# Patient Record
Sex: FEMALE | URBAN - METROPOLITAN AREA
[De-identification: names, ages, dates, MRNs, and addresses within clinical notes are randomized per-mention and may not be internally consistent; named-entity substitution may affect disease eponyms.]

---

## 2017-06-13 ENCOUNTER — EMERGENCY (EMERGENCY)
Facility: HOSPITAL | Age: 52
LOS: 1 days | Discharge: HOME | End: 2017-06-13
Attending: EMERGENCY MEDICINE | Admitting: EMERGENCY MEDICINE
Payer: MEDICAID

## 2017-06-13 VITALS
RESPIRATION RATE: 16 BRPM | DIASTOLIC BLOOD PRESSURE: 114 MMHG | TEMPERATURE: 98 F | SYSTOLIC BLOOD PRESSURE: 158 MMHG | OXYGEN SATURATION: 98 % | HEART RATE: 76 BPM

## 2017-06-13 PROCEDURE — 99283 EMERGENCY DEPT VISIT LOW MDM: CPT | Mod: 25

## 2017-06-13 PROCEDURE — 90471 IMMUNIZATION ADMIN: CPT

## 2017-06-13 PROCEDURE — 90715 TDAP VACCINE 7 YRS/> IM: CPT

## 2017-06-13 PROCEDURE — 99283 EMERGENCY DEPT VISIT LOW MDM: CPT

## 2017-06-13 RX ORDER — TETANUS TOXOID, REDUCED DIPHTHERIA TOXOID AND ACELLULAR PERTUSSIS VACCINE, ADSORBED 5; 2.5; 8; 8; 2.5 [IU]/.5ML; [IU]/.5ML; UG/.5ML; UG/.5ML; UG/.5ML
0.5 SUSPENSION INTRAMUSCULAR ONCE
Qty: 0 | Refills: 0 | Status: COMPLETED | OUTPATIENT
Start: 2017-06-13 | End: 2017-06-13

## 2017-06-13 RX ADMIN — TETANUS TOXOID, REDUCED DIPHTHERIA TOXOID AND ACELLULAR PERTUSSIS VACCINE, ADSORBED 0.5 MILLILITER(S): 5; 2.5; 8; 8; 2.5 SUSPENSION INTRAMUSCULAR at 19:05

## 2017-06-13 RX ADMIN — Medication 1 TABLET(S): at 19:07

## 2017-06-13 NOTE — ED PROVIDER NOTE - MEDICAL DECISION MAKING DETAILS
Attending MD Hopson: 51F with no reported PMH presents to the ED with L thigh bite; Attempt to contact dog owner at [phone], TDaP, Augmentin

## 2017-06-13 NOTE — ED PROVIDER NOTE - PROGRESS NOTE DETAILS
Attending MD Hopson: Spoke with contact at [phone] who reports the he was walking dog "Mehrdad" which is his girlfriend's dog.  Reports UTD on all vaccines.  Reports that he was cleaning up after the dog when it happened and suspects that the dog might have been startled.  Gave me the mother's contact information, Melissa Prado, [phone].  Ms. Prado contacted and verifies that dog is UTD on all vaccines.  Information provided to patient.  Will defer rabies given that information.

## 2017-06-13 NOTE — ED ADULT NURSE NOTE - OBJECTIVE STATEMENT
51 year old female A&OX3 presents with a dog bite to the left upper leg. Patient states she was walking by when a dog bit her in the left leg unprovoked. Patient states the dog belongs to a neighbor and neighbor states the dog has been vaccinated. Patient has a small punctured wound to the left anterior thigh. Bleeding has been controlled with direct pressure.

## 2017-06-13 NOTE — ED PROVIDER NOTE - PLAN OF CARE
1. You may take Motrin 600mg every 6 hours as needed for pain. Take the prescribed antibiotic as directed for the full course, Augmentin 875/125mg tablet twice a day for 10 days.   2. Follow up with your Primary Care Physician as soon as possible for further evaluation.   3. Return to the Emergency Department for any concerning symptoms.

## 2017-06-13 NOTE — ED PROVIDER NOTE - OBJECTIVE STATEMENT
51 y.o. female no significant PMHx p/w dog bite to the left thigh. She is Cymro speaking only, translation assistance provided by her daughter with her permission. She states she was walking on the sidewalk earlier today and a man was walking his dog on a leash beside her when it lunged and bit her on the anterior left thigh. She reports a small central area of ecchymosis. Denies fevers, chills. She is unsure of the dogs vaccination status.

## 2017-06-13 NOTE — ED PROVIDER NOTE - CARE PLAN
Principal Discharge DX:	Dog bite of left thigh, initial encounter Principal Discharge DX:	Dog bite of left thigh, initial encounter  Instructions for follow-up, activity and diet:	1. You may take Motrin 600mg every 6 hours as needed for pain. Take the prescribed antibiotic as directed for the full course, Augmentin 875/125mg tablet twice a day for 10 days.   2. Follow up with your Primary Care Physician as soon as possible for further evaluation.   3. Return to the Emergency Department for any concerning symptoms.

## 2017-06-13 NOTE — ED PROVIDER NOTE - ATTENDING CONTRIBUTION TO CARE
Attending MD Hopson: I personally have seen and examined this patient.  Resident note reviewed and agree on plan of care and except where noted.  See below for details.     **DECLINED TELEPHONE , PREFERS TO USE DAUGHTER, NEGRO, WHO IS BEDSIDE**    51F with no reported PMH presents to the ED with L anterior thigh dog bite.  Reports that she was walking on the street when she was bitten by a medium size white curly haired dog (indicates about 2-3 feet off the floor).  Reports that the dog was being walked on a leash ([phone]) at time of incident 8am.  Reports that wound bled at time of incident but has since stopped.  Reports washed area with soap and water.  Reports came in because was concerned because surrounding area was red immediately after incident.  On exam, FROM at LLE, small 1cm puncture wound to L anterior mid thigh with <1cm surrounding ecchymosis but no erythema, no active bleeding, no purulence, no tenderness to palpation; Plan:  Augmentin here and then Rx, TDaP, contact dog owner for vaccination status
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
## 2017-06-16 DIAGNOSIS — Z23 ENCOUNTER FOR IMMUNIZATION: ICD-10-CM

## 2017-06-16 DIAGNOSIS — S71.102A UNSPECIFIED OPEN WOUND, LEFT THIGH, INITIAL ENCOUNTER: ICD-10-CM
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
## 2017-12-05 ENCOUNTER — EMERGENCY (EMERGENCY)
Facility: HOSPITAL | Age: 52
LOS: 1 days | Discharge: HOME | End: 2017-12-05
Attending: EMERGENCY MEDICINE | Admitting: EMERGENCY MEDICINE
Payer: MEDICAID

## 2017-12-05 VITALS
TEMPERATURE: 100 F | SYSTOLIC BLOOD PRESSURE: 132 MMHG | OXYGEN SATURATION: 97 % | DIASTOLIC BLOOD PRESSURE: 93 MMHG | HEART RATE: 90 BPM | RESPIRATION RATE: 18 BRPM

## 2017-12-05 PROCEDURE — 71020: CPT | Mod: 26

## 2017-12-05 PROCEDURE — 99284 EMERGENCY DEPT VISIT MOD MDM: CPT | Mod: 25

## 2017-12-05 RX ORDER — AMLODIPINE BESYLATE 2.5 MG/1
0 TABLET ORAL
Qty: 0 | Refills: 0 | COMMUNITY

## 2017-12-05 RX ORDER — HYDROCHLOROTHIAZIDE 25 MG
0 TABLET ORAL
Qty: 0 | Refills: 0 | COMMUNITY

## 2017-12-05 NOTE — ED PROVIDER NOTE - MEDICAL DECISION MAKING DETAILS
Jerardo PAYAN: 53 y/o female with hx of HTN and MVP and Smoker here with 2 weeks of SOB, and productive cough. Patient is a Montenegrin speaker and reports she was taking a Russian medication with some improvement but symptoms worsened for the past two days. Endorses fever at home. Denies chills, N/V/D, sore throat, abd pain, urinary symptoms, back pain, travel sick contacts. Exam shows a female in NAD with mild wheezing in RML and LLL and clear skin and soft abd. Consider CAP vs Bronchitis vs Emphysema vs COPD exacerbation vs Viral URI. Plan CXR and reassess.

## 2017-12-05 NOTE — ED PROVIDER NOTE - PROGRESS NOTE DETAILS
- MD Mumtaz,PhD - Resident: Patient found to have flattened diaphragm on CXR. given 2 duonebs with subjective improvement of cough, lungs continue to have crackles at bases, will d/c home on z-pac.

## 2017-12-05 NOTE — ED PROVIDER NOTE - PHYSICAL EXAMINATION
Gen: NAD, AOx3, non-toxic //            Head: NCAT //            HEENT: EOMI, oral mucosa moist, normal conjunctiva //            Lung: crackles at bilateral bases,  speaking in full sentences. //            CV: RRR, no murmurs, rubs or gallops //            Abd: soft, NTND, no guarding, no CVA tenderness //            MSK: no visible deformities //            Neuro: No focal sensory or motor deficits //            Skin: Warm, well perfused, no rash //            Psych: normal affect. ~Danyel Pandya M.D., Ph.D. -Resident

## 2017-12-05 NOTE — ED PROVIDER NOTE - NS ED ROS FT
GENERAL: No fever or chills, //             EYES: no change in vision, //             HEENT: no trouble swallowing or speaking, //             CARDIAC: no chest pain, //                     GI: no abdominal pain, no nausea or no vomiting, no diarrhea or constipation, //             : No changes in urination,  //            SKIN: no rashes,  //            NEURO: no headache,  //             MSK: No joint pain otherwise as HPI or negative. ~Danyel Pandya M.D., Ph.D. -Resident

## 2017-12-05 NOTE — ED ADULT NURSE NOTE - OBJECTIVE STATEMENT
52y female with hx of htn and valve prolapse presents to the ER c/o cough x 2 weeks. pt is alert and oriented x 4 and speaking coherently. Pt states she took a Russian anti viral medication and felt better for a brief period of time but now presents to the ER c/o fevers and cough. pt c/o cp while coughing, generalized across the chest. denies sob, n/v/d. pt in NAD, respirations equal and regular. daughter at the bedside translating for pt- denies translation services. md butt completed. will reassess.

## 2017-12-05 NOTE — ED PROVIDER NOTE - ATTENDING CONTRIBUTION TO CARE
Attending MD Kurtz:  I personally have seen and examined this patient.  Resident note reviewed and agree on plan of care and except where noted.  See MDM for details.

## 2017-12-05 NOTE — ED PROVIDER NOTE - PLAN OF CARE
You were seen in the ER for acute bronchitis. You must follow up with your primary physician in 24 to 48 hours. Return to the ER for any new or worsening signs/symptoms.   1) Use the breathing treatments as needed for difficulty breathing  2) Take the antibiotics as prescribed.

## 2017-12-05 NOTE — ED PROVIDER NOTE - OBJECTIVE STATEMENT
Jerardo PAYAN: 53 y/o female with hx of HTN and MVP and Smoker here with 2 weeks of SOB, and productive cough. Patient is a Cymraes speaker and reports she was taking a Russian medication with some improvement but symptoms worsened for the past two days. Endorses fever at home. Denies chills, N/V/D, sore throat, abd pain, urinary symptoms, back pain, travel sick contacts. Exam shows a female in NAD with mild wheezing in RML and LLL and clear skin and soft abd. Consider CAP vs Bronchitis vs Emphysema vs COPD exacerbation vs Viral URI. Plan CXR and reassess.

## 2017-12-05 NOTE — ED PROVIDER NOTE - CARE PLAN
Principal Discharge DX:	Acute bronchitis, unspecified organism  Instructions for follow-up, activity and diet:	You were seen in the ER for acute bronchitis. You must follow up with your primary physician in 24 to 48 hours. Return to the ER for any new or worsening signs/symptoms.   1) Use the breathing treatments as needed for difficulty breathing  2) Take the antibiotics as prescribed.

## 2017-12-06 VITALS
RESPIRATION RATE: 18 BRPM | DIASTOLIC BLOOD PRESSURE: 90 MMHG | HEART RATE: 80 BPM | SYSTOLIC BLOOD PRESSURE: 136 MMHG | TEMPERATURE: 100 F | OXYGEN SATURATION: 99 %

## 2017-12-06 PROCEDURE — 71046 X-RAY EXAM CHEST 2 VIEWS: CPT

## 2017-12-06 PROCEDURE — 99283 EMERGENCY DEPT VISIT LOW MDM: CPT | Mod: 25

## 2017-12-06 PROCEDURE — 94640 AIRWAY INHALATION TREATMENT: CPT

## 2017-12-06 RX ORDER — IPRATROPIUM/ALBUTEROL SULFATE 18-103MCG
3 AEROSOL WITH ADAPTER (GRAM) INHALATION ONCE
Qty: 0 | Refills: 0 | Status: COMPLETED | OUTPATIENT
Start: 2017-12-06 | End: 2017-12-06

## 2017-12-06 RX ORDER — AZITHROMYCIN 500 MG/1
1 TABLET, FILM COATED ORAL
Qty: 5 | Refills: 0 | OUTPATIENT
Start: 2017-12-06 | End: 2017-12-11

## 2017-12-06 RX ORDER — IPRATROPIUM/ALBUTEROL SULFATE 18-103MCG
1 AEROSOL WITH ADAPTER (GRAM) INHALATION
Qty: 1 | Refills: 0 | OUTPATIENT
Start: 2017-12-06 | End: 2017-12-13

## 2017-12-06 RX ADMIN — Medication 3 MILLILITER(S): at 00:28

## 2018-08-03 ENCOUNTER — TRANSCRIPTION ENCOUNTER (OUTPATIENT)
Age: 53
End: 2018-08-03

## 2019-03-04 NOTE — ED PROVIDER NOTE - EYES, MLM
Dx with breast cancer  Has appt today with surgeon  Please call tomorrow to check in on patient 
Clear bilaterally, pupils equal, round and reactive to light.

## 2020-03-14 ENCOUNTER — TRANSCRIPTION ENCOUNTER (OUTPATIENT)
Age: 55
End: 2020-03-14

## 2021-05-13 ENCOUNTER — TRANSCRIPTION ENCOUNTER (OUTPATIENT)
Age: 56
End: 2021-05-13

## 2021-10-18 ENCOUNTER — TRANSCRIPTION ENCOUNTER (OUTPATIENT)
Age: 56
End: 2021-10-18

## 2022-06-03 ENCOUNTER — NON-APPOINTMENT (OUTPATIENT)
Age: 57
End: 2022-06-03

## 2023-04-26 PROBLEM — R00.1 BRADYCARDIA, UNSPECIFIED: Chronic | Status: ACTIVE | Noted: 2017-06-13

## 2023-04-26 PROBLEM — I10 ESSENTIAL (PRIMARY) HYPERTENSION: Chronic | Status: ACTIVE | Noted: 2017-12-05

## 2023-04-26 PROBLEM — I34.1 NONRHEUMATIC MITRAL (VALVE) PROLAPSE: Chronic | Status: ACTIVE | Noted: 2017-12-05

## 2023-06-27 ENCOUNTER — APPOINTMENT (OUTPATIENT)
Dept: RADIOLOGY | Facility: HOSPITAL | Age: 58
End: 2023-06-27

## 2023-06-27 ENCOUNTER — APPOINTMENT (OUTPATIENT)
Dept: MAMMOGRAPHY | Facility: HOSPITAL | Age: 58
End: 2023-06-27

## 2023-06-27 ENCOUNTER — OUTPATIENT (OUTPATIENT)
Dept: OUTPATIENT SERVICES | Facility: HOSPITAL | Age: 58
LOS: 1 days | End: 2023-06-27
Payer: MEDICAID

## 2023-06-27 DIAGNOSIS — Z12.31 ENCOUNTER FOR SCREENING MAMMOGRAM FOR MALIGNANT NEOPLASM OF BREAST: ICD-10-CM

## 2023-06-27 DIAGNOSIS — Z13.820 ENCOUNTER FOR SCREENING FOR OSTEOPOROSIS: ICD-10-CM

## 2023-06-27 PROBLEM — Z00.00 ENCOUNTER FOR PREVENTIVE HEALTH EXAMINATION: Status: ACTIVE | Noted: 2023-06-27

## 2023-06-27 PROCEDURE — 77080 DXA BONE DENSITY AXIAL: CPT | Mod: 26

## 2023-06-27 PROCEDURE — 77063 BREAST TOMOSYNTHESIS BI: CPT

## 2023-06-27 PROCEDURE — 77080 DXA BONE DENSITY AXIAL: CPT

## 2023-06-27 PROCEDURE — 77067 SCR MAMMO BI INCL CAD: CPT | Mod: 26

## 2023-06-27 PROCEDURE — 77063 BREAST TOMOSYNTHESIS BI: CPT | Mod: 26

## 2023-06-27 PROCEDURE — 77067 SCR MAMMO BI INCL CAD: CPT

## 2023-07-12 ENCOUNTER — APPOINTMENT (OUTPATIENT)
Dept: ULTRASOUND IMAGING | Facility: HOSPITAL | Age: 58
End: 2023-07-12

## 2023-10-29 ENCOUNTER — NON-APPOINTMENT (OUTPATIENT)
Age: 58
End: 2023-10-29

## 2023-11-02 ENCOUNTER — NON-APPOINTMENT (OUTPATIENT)
Age: 58
End: 2023-11-02

## 2024-11-13 ENCOUNTER — APPOINTMENT (OUTPATIENT)
Dept: MAMMOGRAPHY | Facility: HOSPITAL | Age: 59
End: 2024-11-13
Payer: COMMERCIAL

## 2024-11-13 ENCOUNTER — OUTPATIENT (OUTPATIENT)
Dept: OUTPATIENT SERVICES | Facility: HOSPITAL | Age: 59
LOS: 1 days | End: 2024-11-13
Payer: COMMERCIAL

## 2024-11-13 DIAGNOSIS — Z00.8 ENCOUNTER FOR OTHER GENERAL EXAMINATION: ICD-10-CM

## 2024-11-13 PROCEDURE — 77063 BREAST TOMOSYNTHESIS BI: CPT | Mod: 26

## 2024-11-13 PROCEDURE — 77067 SCR MAMMO BI INCL CAD: CPT

## 2024-11-13 PROCEDURE — 77067 SCR MAMMO BI INCL CAD: CPT | Mod: 26

## 2024-11-13 PROCEDURE — 77063 BREAST TOMOSYNTHESIS BI: CPT

## 2025-05-22 ENCOUNTER — APPOINTMENT (OUTPATIENT)
Dept: RADIOLOGY | Facility: HOSPITAL | Age: 60
End: 2025-05-22
Payer: COMMERCIAL

## 2025-05-22 ENCOUNTER — OUTPATIENT (OUTPATIENT)
Dept: OUTPATIENT SERVICES | Facility: HOSPITAL | Age: 60
LOS: 1 days | End: 2025-05-22
Payer: COMMERCIAL

## 2025-05-22 DIAGNOSIS — Z00.8 ENCOUNTER FOR OTHER GENERAL EXAMINATION: ICD-10-CM

## 2025-05-22 PROCEDURE — 73130 X-RAY EXAM OF HAND: CPT | Mod: 26,LT

## 2025-05-22 PROCEDURE — 73130 X-RAY EXAM OF HAND: CPT

## 2025-05-27 ENCOUNTER — NON-APPOINTMENT (OUTPATIENT)
Age: 60
End: 2025-05-27

## 2025-05-27 ENCOUNTER — APPOINTMENT (OUTPATIENT)
Dept: ORTHOPEDIC SURGERY | Facility: CLINIC | Age: 60
End: 2025-05-27

## 2025-05-27 VITALS — HEIGHT: 64.17 IN | BODY MASS INDEX: 24.09 KG/M2 | WEIGHT: 141.09 LBS

## 2025-05-27 DIAGNOSIS — M19.049 PRIMARY OSTEOARTHRITIS, UNSPECIFIED HAND: ICD-10-CM

## 2025-05-27 PROCEDURE — 20600 DRAIN/INJ JOINT/BURSA W/O US: CPT | Mod: LT

## 2025-05-27 PROCEDURE — 99203 OFFICE O/P NEW LOW 30 MIN: CPT | Mod: 25

## 2025-09-15 ENCOUNTER — NON-APPOINTMENT (OUTPATIENT)
Age: 60
End: 2025-09-15

## 2025-09-15 ENCOUNTER — APPOINTMENT (OUTPATIENT)
Dept: CARDIOLOGY | Facility: CLINIC | Age: 60
End: 2025-09-15
Payer: COMMERCIAL

## 2025-09-15 VITALS
HEART RATE: 65 BPM | SYSTOLIC BLOOD PRESSURE: 116 MMHG | WEIGHT: 139 LBS | OXYGEN SATURATION: 98 % | BODY MASS INDEX: 23.73 KG/M2 | DIASTOLIC BLOOD PRESSURE: 78 MMHG | HEIGHT: 64 IN

## 2025-09-15 DIAGNOSIS — R00.2 PALPITATIONS: ICD-10-CM

## 2025-09-15 DIAGNOSIS — I34.1 NONRHEUMATIC MITRAL (VALVE) PROLAPSE: ICD-10-CM

## 2025-09-15 DIAGNOSIS — Z82.3 FAMILY HISTORY OF STROKE: ICD-10-CM

## 2025-09-15 DIAGNOSIS — Z87.09 PERSONAL HISTORY OF OTHER DISEASES OF THE RESPIRATORY SYSTEM: ICD-10-CM

## 2025-09-15 DIAGNOSIS — Z78.9 OTHER SPECIFIED HEALTH STATUS: ICD-10-CM

## 2025-09-15 DIAGNOSIS — I10 ESSENTIAL (PRIMARY) HYPERTENSION: ICD-10-CM

## 2025-09-15 DIAGNOSIS — Z82.49 FAMILY HISTORY OF ISCHEMIC HEART DISEASE AND OTHER DISEASES OF THE CIRCULATORY SYSTEM: ICD-10-CM

## 2025-09-15 PROCEDURE — 93000 ELECTROCARDIOGRAM COMPLETE: CPT

## 2025-09-15 PROCEDURE — 99204 OFFICE O/P NEW MOD 45 MIN: CPT | Mod: 25

## 2025-09-15 RX ORDER — OLMESARTAN MEDOXOMIL 20 MG/1
20 TABLET, FILM COATED ORAL
Refills: 0 | Status: ACTIVE | COMMUNITY

## 2025-09-15 RX ORDER — ESCITALOPRAM OXALATE 5 MG/5ML
5 SOLUTION ORAL
Refills: 0 | Status: ACTIVE | COMMUNITY

## 2025-09-15 RX ORDER — AMLODIPINE BESYLATE 2.5 MG/1
2.5 TABLET ORAL
Refills: 0 | Status: ACTIVE | COMMUNITY

## 2025-09-15 RX ORDER — AMLODIPINE BESYLATE 5 MG/1
5 TABLET ORAL
Refills: 0 | Status: ACTIVE | COMMUNITY

## 2025-09-15 RX ORDER — ESCITALOPRAM OXALATE 5 MG/1
5 TABLET ORAL
Refills: 0 | Status: ACTIVE | COMMUNITY

## 2025-09-25 PROCEDURE — 93244 EXT ECG>48HR<7D REV&INTERPJ: CPT
